# Patient Record
Sex: MALE | Race: WHITE | URBAN - METROPOLITAN AREA
[De-identification: names, ages, dates, MRNs, and addresses within clinical notes are randomized per-mention and may not be internally consistent; named-entity substitution may affect disease eponyms.]

---

## 2021-04-26 ENCOUNTER — OFFICE VISIT (OUTPATIENT)
Dept: URBAN - METROPOLITAN AREA CLINIC 128 | Facility: CLINIC | Age: 71
End: 2021-04-26
Payer: MEDICARE

## 2021-04-26 ENCOUNTER — WEB ENCOUNTER (OUTPATIENT)
Dept: URBAN - METROPOLITAN AREA CLINIC 128 | Facility: CLINIC | Age: 71
End: 2021-04-26

## 2021-04-26 DIAGNOSIS — R63.4 UNINTENTIONAL WEIGHT LOSS: ICD-10-CM

## 2021-04-26 DIAGNOSIS — K52.9 CHRONIC DIARRHEA: ICD-10-CM

## 2021-04-26 PROCEDURE — 99204 OFFICE O/P NEW MOD 45 MIN: CPT | Performed by: INTERNAL MEDICINE

## 2021-04-26 PROCEDURE — 99244 OFF/OP CNSLTJ NEW/EST MOD 40: CPT | Performed by: INTERNAL MEDICINE

## 2021-04-26 RX ORDER — METFORMIN ER 500 MG 500 MG/1
1 TABLET WITH EVENING MEAL TABLET ORAL ONCE A DAY
Status: ACTIVE | COMMUNITY

## 2021-04-26 RX ORDER — SITAGLIPTIN 50 MG/1
AS DIRECTED TABLET, FILM COATED ORAL
Status: ACTIVE | COMMUNITY

## 2021-04-26 RX ORDER — EZETIMIBE 10 MG/1
1 TABLET TABLET ORAL ONCE A DAY
Status: ACTIVE | COMMUNITY

## 2021-04-26 RX ORDER — SODIUM, POTASSIUM,MAG SULFATES 17.5-3.13G
354ML SOLUTION, RECONSTITUTED, ORAL ORAL
Qty: 354 MILLILITER | Refills: 0 | OUTPATIENT
Start: 2021-04-26 | End: 2021-04-27

## 2021-04-26 RX ORDER — CHOLESTYRAMINE LIGHT 4 G/5.7G
1 SCOOP POWDER, FOR SUSPENSION ORAL BID
Qty: 60 | Refills: 2 | OUTPATIENT
Start: 2021-04-26

## 2021-04-26 RX ORDER — GLIMEPIRIDE 1 MG/1
1 TABLET WITH BREAKFAST OR THE FIRST MAIN MEAL OF THE DAY TABLET ORAL ONCE A DAY
Status: ACTIVE | COMMUNITY

## 2021-04-26 RX ORDER — LEVOTHYROXINE SODIUM 25 UG/1
1 TABLET IN THE MORNING ON AN EMPTY STOMACH TABLET ORAL ONCE A DAY
Status: ACTIVE | COMMUNITY

## 2021-04-26 RX ORDER — KRILL/OM-3/DHA/EPA/PHOSPHO/AST 1000-230MG
1 TABLET CAPSULE ORAL ONCE A DAY
Status: ACTIVE | COMMUNITY

## 2021-04-26 RX ORDER — FENOFIBRATE 134 MG/1
1 CAPSULE WITH A MEAL CAPSULE ORAL ONCE A DAY
Status: ACTIVE | COMMUNITY

## 2021-04-26 RX ORDER — ROSUVASTATIN CALCIUM 10 MG/1
1 TABLET TABLET, FILM COATED ORAL ONCE A DAY
Status: ACTIVE | COMMUNITY

## 2021-04-26 NOTE — HPI-TODAY'S VISIT:
Mr. Pompa is a 70 year old male who was referred by Dr. Aj Wills for chronic diarrhea. A  copy of this note will be sent to his referring physician.    Mr. Pompa reports having diarrhea for approximately 1 month.  He reports on average having 3-4 BMs/day. He reports diarrhea is associated with abdominal cramping but not hematochezia.   He reports having lost 14 lbs since onset of symptoms.  He received lomotil and reports no improvement in symptoms.  C. diff testing performed by his PCP was negative.   Mr. Pompa has never had an EGD or colonoscopy before.   He reports having CKD stage 3.

## 2021-05-06 ENCOUNTER — TELEPHONE ENCOUNTER (OUTPATIENT)
Dept: URBAN - METROPOLITAN AREA CLINIC 23 | Facility: CLINIC | Age: 71
End: 2021-05-06

## 2021-05-13 ENCOUNTER — CLAIMS CREATED FROM THE CLAIM WINDOW (OUTPATIENT)
Dept: URBAN - METROPOLITAN AREA CLINIC 4 | Facility: CLINIC | Age: 71
End: 2021-05-13
Payer: MEDICARE

## 2021-05-13 ENCOUNTER — OFFICE VISIT (OUTPATIENT)
Dept: URBAN - METROPOLITAN AREA SURGERY CENTER 31 | Facility: SURGERY CENTER | Age: 71
End: 2021-05-13
Payer: MEDICARE

## 2021-05-13 DIAGNOSIS — K29.40 CHRONIC ATROPHIC GASTRITIS WITHOUT BLEEDING: ICD-10-CM

## 2021-05-13 DIAGNOSIS — K51.919 ULCERATIVE COLITIS, UNSPECIFIED WITH UNSPECIFIED COMPLICATIONS: ICD-10-CM

## 2021-05-13 DIAGNOSIS — K50.811 CROHN'S DISEASE OF BOTH SMALL AND LARGE INTESTINE WITH RECTAL BLEEDING: ICD-10-CM

## 2021-05-13 DIAGNOSIS — K31.89 GASTRIC PERFORATION WITHOUT ULCER: ICD-10-CM

## 2021-05-13 DIAGNOSIS — K31.89 ACQUIRED DEFORMITY OF DUODENUM: ICD-10-CM

## 2021-05-13 PROCEDURE — 45380 COLONOSCOPY AND BIOPSY: CPT | Performed by: INTERNAL MEDICINE

## 2021-05-13 PROCEDURE — 43239 EGD BIOPSY SINGLE/MULTIPLE: CPT | Performed by: INTERNAL MEDICINE

## 2021-05-13 PROCEDURE — 88341 IMHCHEM/IMCYTCHM EA ADD ANTB: CPT | Performed by: PATHOLOGY

## 2021-05-13 PROCEDURE — 88342 IMHCHEM/IMCYTCHM 1ST ANTB: CPT | Performed by: PATHOLOGY

## 2021-05-13 PROCEDURE — 88305 TISSUE EXAM BY PATHOLOGIST: CPT | Performed by: PATHOLOGY

## 2021-05-13 PROCEDURE — G8907 PT DOC NO EVENTS ON DISCHARG: HCPCS | Performed by: INTERNAL MEDICINE

## 2021-05-13 RX ORDER — LEVOTHYROXINE SODIUM 25 UG/1
1 TABLET IN THE MORNING ON AN EMPTY STOMACH TABLET ORAL ONCE A DAY
Status: ACTIVE | COMMUNITY

## 2021-05-13 RX ORDER — FENOFIBRATE 134 MG/1
1 CAPSULE WITH A MEAL CAPSULE ORAL ONCE A DAY
Status: ACTIVE | COMMUNITY

## 2021-05-13 RX ORDER — CHOLESTYRAMINE LIGHT 4 G/5.7G
1 SCOOP POWDER, FOR SUSPENSION ORAL BID
Qty: 60 | Refills: 2 | Status: ACTIVE | COMMUNITY
Start: 2021-04-26

## 2021-05-13 RX ORDER — GLIMEPIRIDE 1 MG/1
1 TABLET WITH BREAKFAST OR THE FIRST MAIN MEAL OF THE DAY TABLET ORAL ONCE A DAY
Status: ACTIVE | COMMUNITY

## 2021-05-13 RX ORDER — PREDNISONE 20 MG/1
2 TABLETS TABLET ORAL ONCE A DAY
Qty: 60 TABLET | Refills: 2 | OUTPATIENT
Start: 2021-05-13 | End: 2021-08-11

## 2021-05-13 RX ORDER — METFORMIN ER 500 MG 500 MG/1
1 TABLET WITH EVENING MEAL TABLET ORAL ONCE A DAY
Status: ACTIVE | COMMUNITY

## 2021-05-13 RX ORDER — SITAGLIPTIN 50 MG/1
AS DIRECTED TABLET, FILM COATED ORAL
Status: ACTIVE | COMMUNITY

## 2021-05-13 RX ORDER — EZETIMIBE 10 MG/1
1 TABLET TABLET ORAL ONCE A DAY
Status: ACTIVE | COMMUNITY

## 2021-05-13 RX ORDER — KRILL/OM-3/DHA/EPA/PHOSPHO/AST 1000-230MG
1 TABLET CAPSULE ORAL ONCE A DAY
Status: ACTIVE | COMMUNITY

## 2021-05-13 RX ORDER — ROSUVASTATIN CALCIUM 10 MG/1
1 TABLET TABLET, FILM COATED ORAL ONCE A DAY
Status: ACTIVE | COMMUNITY

## 2021-05-25 ENCOUNTER — TELEPHONE ENCOUNTER (OUTPATIENT)
Dept: URBAN - METROPOLITAN AREA CLINIC 92 | Facility: CLINIC | Age: 71
End: 2021-05-25

## 2021-06-30 ENCOUNTER — OFFICE VISIT (OUTPATIENT)
Dept: URBAN - METROPOLITAN AREA CLINIC 128 | Facility: CLINIC | Age: 71
End: 2021-06-30
Payer: MEDICARE

## 2021-06-30 VITALS
WEIGHT: 161.4 LBS | TEMPERATURE: 97.8 F | SYSTOLIC BLOOD PRESSURE: 132 MMHG | DIASTOLIC BLOOD PRESSURE: 75 MMHG | BODY MASS INDEX: 23.91 KG/M2 | HEART RATE: 94 BPM | HEIGHT: 69 IN

## 2021-06-30 DIAGNOSIS — K52.9 CHRONIC DIARRHEA: ICD-10-CM

## 2021-06-30 DIAGNOSIS — K31.89 INTESTINAL METAPLASIA OF GASTRIC MUCOSA: ICD-10-CM

## 2021-06-30 DIAGNOSIS — K29.60 OTHER GASTRITIS WITHOUT HEMORRHAGE, UNSPECIFIED CHRONICITY: ICD-10-CM

## 2021-06-30 DIAGNOSIS — R63.4 UNINTENTIONAL WEIGHT LOSS: ICD-10-CM

## 2021-06-30 PROCEDURE — 99213 OFFICE O/P EST LOW 20 MIN: CPT | Performed by: PHYSICIAN ASSISTANT

## 2021-06-30 RX ORDER — SITAGLIPTIN 50 MG/1
AS DIRECTED TABLET, FILM COATED ORAL
COMMUNITY

## 2021-06-30 RX ORDER — LEVOTHYROXINE SODIUM 25 UG/1
1 TABLET IN THE MORNING ON AN EMPTY STOMACH TABLET ORAL ONCE A DAY
COMMUNITY

## 2021-06-30 RX ORDER — KRILL/OM-3/DHA/EPA/PHOSPHO/AST 1000-230MG
1 TABLET CAPSULE ORAL ONCE A DAY
COMMUNITY

## 2021-06-30 RX ORDER — METFORMIN ER 500 MG 500 MG/1
1 TABLET WITH EVENING MEAL TABLET ORAL ONCE A DAY
COMMUNITY

## 2021-06-30 RX ORDER — CHOLESTYRAMINE LIGHT 4 G/5.7G
1 SCOOP POWDER, FOR SUSPENSION ORAL BID
Qty: 60 | Refills: 2 | OUTPATIENT

## 2021-06-30 RX ORDER — FENOFIBRATE 134 MG/1
1 CAPSULE WITH A MEAL CAPSULE ORAL ONCE A DAY
COMMUNITY

## 2021-06-30 RX ORDER — PREDNISONE 20 MG/1
2 TABLETS TABLET ORAL ONCE A DAY
Qty: 60 TABLET | Refills: 2 | COMMUNITY
Start: 2021-05-13 | End: 2021-08-11

## 2021-06-30 RX ORDER — GLIMEPIRIDE 1 MG/1
1 TABLET WITH BREAKFAST OR THE FIRST MAIN MEAL OF THE DAY TABLET ORAL ONCE A DAY
COMMUNITY

## 2021-06-30 RX ORDER — EZETIMIBE 10 MG/1
1 TABLET TABLET ORAL ONCE A DAY
COMMUNITY

## 2021-06-30 RX ORDER — CHOLESTYRAMINE LIGHT 4 G/5.7G
1 SCOOP POWDER, FOR SUSPENSION ORAL BID
Qty: 60 | Refills: 2 | COMMUNITY
Start: 2021-04-26

## 2021-06-30 RX ORDER — ROSUVASTATIN CALCIUM 10 MG/1
1 TABLET TABLET, FILM COATED ORAL ONCE A DAY
COMMUNITY

## 2021-06-30 RX ORDER — MESALAMINE 1.2 G/1
4 TABLETS TABLET, DELAYED RELEASE ORAL ONCE A DAY
Qty: 360 TABLET | Refills: 2 | OUTPATIENT
Start: 2021-06-30 | End: 2022-03-27

## 2021-06-30 NOTE — HPI-TODAY'S VISIT:
Mr. Pompa is a 70 year old male who was referred by Dr. Aj Wills for chronic diarrhea. A  copy of this note will be sent to his referring physician.    Mr. Pompa reports having diarrhea for approximately 1 month.  He reports on average having 3-4 BMs/day. He reports diarrhea is associated with abdominal cramping but not hematochezia.   He reports having lost 14 lbs since onset of symptoms.  He received lomotil and reports no improvement in symptoms.  C. diff testing performed by his PCP was negative.   He reports having CKD stage 3.  His 5/21 EGD revealed chronic inactive gastritis with focal intestinal metaplasia and histological changes suggestive of treated h pylori gastritis in the stomach antrum with negative H pylori, dysplasia, or malignancy. His 2021 colonoscopy revealed diffuse chronic active colitis consistent with ulcerative colitis in left and right colon. He has not done the labs yet. He is on prednisone and doing very well on this and now has 2-3 BMs a day that are formed now.

## 2021-07-14 PROBLEM — 71833008: Status: ACTIVE | Noted: 2021-05-13

## 2021-08-31 ENCOUNTER — TELEPHONE ENCOUNTER (OUTPATIENT)
Dept: URBAN - METROPOLITAN AREA CLINIC 128 | Facility: CLINIC | Age: 71
End: 2021-08-31

## 2021-10-07 ENCOUNTER — OFFICE VISIT (OUTPATIENT)
Dept: URBAN - METROPOLITAN AREA SURGERY CENTER 31 | Facility: SURGERY CENTER | Age: 71
End: 2021-10-07
Payer: MEDICARE

## 2021-10-07 DIAGNOSIS — K51.00 ACUTE ULCERATIVE PANCOLITIS: ICD-10-CM

## 2021-10-07 DIAGNOSIS — K21.9 ACID REFLUX: ICD-10-CM

## 2021-10-07 PROCEDURE — G8907 PT DOC NO EVENTS ON DISCHARG: HCPCS | Performed by: INTERNAL MEDICINE

## 2021-10-07 PROCEDURE — 45380 COLONOSCOPY AND BIOPSY: CPT | Performed by: INTERNAL MEDICINE

## 2021-10-07 PROCEDURE — 43235 EGD DIAGNOSTIC BRUSH WASH: CPT | Performed by: INTERNAL MEDICINE

## 2021-11-01 ENCOUNTER — TELEPHONE ENCOUNTER (OUTPATIENT)
Dept: URBAN - METROPOLITAN AREA CLINIC 19 | Facility: CLINIC | Age: 71
End: 2021-11-01

## 2021-12-08 ENCOUNTER — OFFICE VISIT (OUTPATIENT)
Dept: URBAN - METROPOLITAN AREA CLINIC 128 | Facility: CLINIC | Age: 71
End: 2021-12-08
Payer: MEDICARE

## 2021-12-08 ENCOUNTER — WEB ENCOUNTER (OUTPATIENT)
Dept: URBAN - METROPOLITAN AREA CLINIC 128 | Facility: CLINIC | Age: 71
End: 2021-12-08

## 2021-12-08 VITALS
DIASTOLIC BLOOD PRESSURE: 79 MMHG | SYSTOLIC BLOOD PRESSURE: 140 MMHG | HEART RATE: 102 BPM | WEIGHT: 187 LBS | TEMPERATURE: 97.3 F | HEIGHT: 69 IN | BODY MASS INDEX: 27.7 KG/M2

## 2021-12-08 DIAGNOSIS — K29.60 OTHER GASTRITIS WITHOUT HEMORRHAGE, UNSPECIFIED CHRONICITY: ICD-10-CM

## 2021-12-08 DIAGNOSIS — Z86.19 HISTORY OF HEPATITIS C: ICD-10-CM

## 2021-12-08 DIAGNOSIS — R63.4 UNINTENTIONAL WEIGHT LOSS: ICD-10-CM

## 2021-12-08 DIAGNOSIS — K52.9 CHRONIC DIARRHEA: ICD-10-CM

## 2021-12-08 PROCEDURE — 99213 OFFICE O/P EST LOW 20 MIN: CPT | Performed by: PHYSICIAN ASSISTANT

## 2021-12-08 RX ORDER — EZETIMIBE 10 MG/1
1 TABLET TABLET ORAL ONCE A DAY
Status: ACTIVE | COMMUNITY

## 2021-12-08 RX ORDER — GLIMEPIRIDE 1 MG/1
1 TABLET WITH BREAKFAST OR THE FIRST MAIN MEAL OF THE DAY TABLET ORAL ONCE A DAY
Status: ACTIVE | COMMUNITY

## 2021-12-08 RX ORDER — MESALAMINE 1.2 G/1
4 TABLETS TABLET, DELAYED RELEASE ORAL ONCE A DAY
Qty: 360 TABLET | Refills: 2 | Status: ACTIVE | COMMUNITY
Start: 2021-06-30 | End: 2022-03-27

## 2021-12-08 RX ORDER — MESALAMINE 1.2 G/1
4 TABLETS TABLET, DELAYED RELEASE ORAL ONCE A DAY
Qty: 360 TABLET | Refills: 2 | OUTPATIENT

## 2021-12-08 RX ORDER — CHOLESTYRAMINE LIGHT 4 G/5.7G
1 SCOOP POWDER, FOR SUSPENSION ORAL BID
Qty: 60 | Refills: 2 | Status: DISCONTINUED | COMMUNITY

## 2021-12-08 RX ORDER — KRILL/OM-3/DHA/EPA/PHOSPHO/AST 1000-230MG
1 TABLET CAPSULE ORAL ONCE A DAY
Status: ACTIVE | COMMUNITY

## 2021-12-08 RX ORDER — SITAGLIPTIN 50 MG/1
AS DIRECTED TABLET, FILM COATED ORAL
Status: ACTIVE | COMMUNITY

## 2021-12-08 RX ORDER — FENOFIBRATE 134 MG/1
1 CAPSULE WITH A MEAL CAPSULE ORAL ONCE A DAY
Status: ACTIVE | COMMUNITY

## 2021-12-08 RX ORDER — METFORMIN ER 500 MG 500 MG/1
1 TABLET WITH EVENING MEAL TABLET ORAL ONCE A DAY
Status: ACTIVE | COMMUNITY

## 2021-12-08 RX ORDER — LEVOTHYROXINE SODIUM 25 UG/1
1 TABLET IN THE MORNING ON AN EMPTY STOMACH TABLET ORAL ONCE A DAY
Status: ACTIVE | COMMUNITY

## 2021-12-08 RX ORDER — ROSUVASTATIN CALCIUM 10 MG/1
1 TABLET TABLET, FILM COATED ORAL ONCE A DAY
Status: ACTIVE | COMMUNITY

## 2021-12-08 NOTE — HPI-TODAY'S VISIT:
Mr. Pompa is a 70 year old male who was referred by Dr. Aj Wills for chronic diarrhea. A  copy of this note will be sent to his referring physician.    Mr. Pompa reports having had previously diarrhea  of 5-6 BMs/day but now on lialda 4 pills a day he is having 2-3 Bms a day of formed stool with no diarrhea. He has gained 20 pounds intentionally now since 06/21. C. diff testing performed by his PCP was negative. He reports having CKD stage 3.  His 5/21 EGD revealed chronic inactive gastritis with focal intestinal metaplasia and histological changes suggestive of treated h pylori gastritis in the stomach antrum with negative H pylori, dysplasia, or malignancy. His /21 2021 colonoscopy revealed diffuse chronic active colitis consistent with ulcerative colitis in left and right colon. He has not done the labs yet but he will have them done. His 10/21 EGD was normal, no specimens were collected and the 10/21 colonoscopy revealed focal mild colitis in the right and left colon. The patient is feeling overall better.

## 2021-12-10 ENCOUNTER — OFFICE VISIT (OUTPATIENT)
Dept: URBAN - METROPOLITAN AREA CLINIC 128 | Facility: CLINIC | Age: 71
End: 2021-12-10

## 2021-12-20 ENCOUNTER — TELEPHONE ENCOUNTER (OUTPATIENT)
Dept: URBAN - METROPOLITAN AREA CLINIC 128 | Facility: CLINIC | Age: 71
End: 2021-12-20

## 2021-12-21 LAB
ACCA: 19
ALCA: 23
AMCA: 121
ATYPICAL PANCA: NEGATIVE
CA 19-9: 11
CEA: 2.4
COMMENTS: (no result)
DEAMIDATED GLIADIN ABS, IGA: 3
DEAMIDATED GLIADIN ABS, IGG: 1
ENDOMYSIAL ANTIBODY IGA: NEGATIVE
GASCA: 2
HCV GENOTYPE: (no result)
HCV LOG10: (no result)
HEPATITIS C GENOTYPE: (no result)
HEPATITIS C QUANTITATION: (no result)
IMMUNOGLOBULIN A, QN, SERUM: 140
Lab: (no result)
REFLEX TEST INFORMATION: (no result)
T-TRANSGLUTAMINASE (TTG) IGA: <2
T-TRANSGLUTAMINASE (TTG) IGG: <2
TEST INFORMATION:: (no result)
TSH: 2.27

## 2022-01-19 ENCOUNTER — ERX REFILL RESPONSE (OUTPATIENT)
Dept: URBAN - METROPOLITAN AREA CLINIC 128 | Facility: CLINIC | Age: 72
End: 2022-01-19

## 2022-01-19 RX ORDER — MESALAMINE 1.2 G/1
4 TABLETS TABLET, DELAYED RELEASE ORAL ONCE A DAY
Qty: 360 TABLET | Refills: 2 | OUTPATIENT

## 2022-01-19 RX ORDER — MESALAMINE 1.2 G/1
TAKE 4 TABLETS BY MOUTH  ONCE DAILY TABLET, DELAYED RELEASE ORAL
Qty: 360 TABLET | Refills: 4 | OUTPATIENT

## 2022-01-20 ENCOUNTER — WEB ENCOUNTER (OUTPATIENT)
Dept: URBAN - METROPOLITAN AREA CLINIC 13 | Facility: CLINIC | Age: 72
End: 2022-01-20

## 2022-01-20 ENCOUNTER — TELEPHONE ENCOUNTER (OUTPATIENT)
Dept: URBAN - METROPOLITAN AREA CLINIC 128 | Facility: CLINIC | Age: 72
End: 2022-01-20

## 2022-01-20 RX ORDER — MESALAMINE 1.2 G/1
TAKE 4 TABLETS BY MOUTH  ONCE DAILY TABLET, DELAYED RELEASE ORAL
Qty: 360 TABLET | Refills: 4

## 2022-01-31 ENCOUNTER — TELEPHONE ENCOUNTER (OUTPATIENT)
Dept: URBAN - METROPOLITAN AREA CLINIC 18 | Facility: CLINIC | Age: 72
End: 2022-01-31

## 2022-03-03 ENCOUNTER — WEB ENCOUNTER (OUTPATIENT)
Dept: URBAN - METROPOLITAN AREA CLINIC 128 | Facility: CLINIC | Age: 72
End: 2022-03-03

## 2022-03-09 ENCOUNTER — WEB ENCOUNTER (OUTPATIENT)
Dept: URBAN - METROPOLITAN AREA CLINIC 128 | Facility: CLINIC | Age: 72
End: 2022-03-09

## 2022-03-09 ENCOUNTER — OFFICE VISIT (OUTPATIENT)
Dept: URBAN - METROPOLITAN AREA CLINIC 128 | Facility: CLINIC | Age: 72
End: 2022-03-09
Payer: MEDICARE

## 2022-03-09 DIAGNOSIS — K52.9 IBD (INFLAMMATORY BOWEL DISEASE): ICD-10-CM

## 2022-03-09 DIAGNOSIS — R63.4 UNINTENTIONAL WEIGHT LOSS: ICD-10-CM

## 2022-03-09 DIAGNOSIS — K31.89 INTESTINAL METAPLASIA OF GASTRIC MUCOSA: ICD-10-CM

## 2022-03-09 DIAGNOSIS — K29.60 OTHER GASTRITIS WITHOUT HEMORRHAGE, UNSPECIFIED CHRONICITY: ICD-10-CM

## 2022-03-09 PROBLEM — 4556007: Status: ACTIVE | Noted: 2021-06-30

## 2022-03-09 PROCEDURE — 99213 OFFICE O/P EST LOW 20 MIN: CPT | Performed by: PHYSICIAN ASSISTANT

## 2022-03-09 RX ORDER — FENOFIBRATE 134 MG/1
1 CAPSULE WITH A MEAL CAPSULE ORAL ONCE A DAY
Status: ACTIVE | COMMUNITY

## 2022-03-09 RX ORDER — LEVOTHYROXINE SODIUM 25 UG/1
1 TABLET IN THE MORNING ON AN EMPTY STOMACH TABLET ORAL ONCE A DAY
Status: ACTIVE | COMMUNITY

## 2022-03-09 RX ORDER — ROSUVASTATIN CALCIUM 10 MG/1
1 TABLET TABLET, FILM COATED ORAL ONCE A DAY
Status: ACTIVE | COMMUNITY

## 2022-03-09 RX ORDER — SITAGLIPTIN 50 MG/1
AS DIRECTED TABLET, FILM COATED ORAL
Status: ACTIVE | COMMUNITY

## 2022-03-09 RX ORDER — GLIMEPIRIDE 1 MG/1
1 TABLET WITH BREAKFAST OR THE FIRST MAIN MEAL OF THE DAY TABLET ORAL ONCE A DAY
Status: ACTIVE | COMMUNITY

## 2022-03-09 RX ORDER — KRILL/OM-3/DHA/EPA/PHOSPHO/AST 1000-230MG
1 TABLET CAPSULE ORAL ONCE A DAY
Status: ACTIVE | COMMUNITY

## 2022-03-09 RX ORDER — MESALAMINE 1.2 G/1
TAKE 4 TABLETS BY MOUTH  ONCE DAILY TABLET, DELAYED RELEASE ORAL
Qty: 360 TABLET | Refills: 4 | Status: ACTIVE | COMMUNITY

## 2022-03-09 RX ORDER — METFORMIN ER 500 MG 500 MG/1
1 TABLET WITH EVENING MEAL TABLET ORAL ONCE A DAY
Status: ON HOLD | COMMUNITY

## 2022-03-09 RX ORDER — EZETIMIBE 10 MG/1
1 TABLET TABLET ORAL ONCE A DAY
Status: ACTIVE | COMMUNITY

## 2022-03-09 RX ORDER — MESALAMINE 1.2 G/1
4 TABLETS TABLET, DELAYED RELEASE ORAL ONCE A DAY
Qty: 360 TABLET | Refills: 2 | OUTPATIENT

## 2022-03-09 RX ORDER — POLYETHYLENE GLYCOL 3350, SODIUM CHLORIDE, SODIUM BICARBONATE AND POTASSIUM CHLORIDE 420G
AS DIRECTED KIT ORAL ONCE
Qty: 420 GRAM | Refills: 0 | OUTPATIENT
Start: 2022-03-09 | End: 2022-03-10

## 2022-03-09 NOTE — HPI-TODAY'S VISIT:
Mr. Pompa is a 70 year old male who was initially referred by Dr. Aj Wills for chronic diarrhea. A  copy of this note will be sent to his referring physician.    Mr. Pompa reports having had previously diarrhea  of 5-6 BMs/day but now on lialda 4 pills a day he is having 2 BMs a day of formed stool with no diarrhea. He has gained 20 pounds intentionally now since 06/21. C. diff testing performed by his PCP was negative. He reports having CKD stage 3. He is not on dialysis. He has no cardiac or pulmonary issues.  His 5/21 EGD revealed chronic inactive gastritis with focal intestinal metaplasia and histological changes suggestive of treated h pylori gastritis in the stomach antrum with negative H pylori, dysplasia, or malignancy. His 12/2021 colonoscopy revealed diffuse chronic active colitis consistent with ulcerative colitis in left and right colon. His 10/21 EGD was normal, no specimens were collected and the 10/21 colonoscopy revealed focal mild colitis in the right and left colon. The patient is feeling overall better. His IBD exanded panel was leaning towrds Crohns disease. He did not do his MR enterography because at this time he could not have the contrast as he has CKD stage 3. His celiac panel was negative. He has not done his h pylori test yet.

## 2022-03-10 ENCOUNTER — TELEPHONE ENCOUNTER (OUTPATIENT)
Dept: URBAN - METROPOLITAN AREA CLINIC 128 | Facility: CLINIC | Age: 72
End: 2022-03-10

## 2022-12-08 ENCOUNTER — OFFICE VISIT (OUTPATIENT)
Dept: URBAN - METROPOLITAN AREA SURGERY CENTER 31 | Facility: SURGERY CENTER | Age: 72
End: 2022-12-08

## 2022-12-12 ENCOUNTER — TELEPHONE ENCOUNTER (OUTPATIENT)
Dept: URBAN - METROPOLITAN AREA CLINIC 128 | Facility: CLINIC | Age: 72
End: 2022-12-12

## 2022-12-13 ENCOUNTER — TELEPHONE ENCOUNTER (OUTPATIENT)
Dept: URBAN - METROPOLITAN AREA CLINIC 19 | Facility: CLINIC | Age: 72
End: 2022-12-13

## 2022-12-21 ENCOUNTER — TELEPHONE ENCOUNTER (OUTPATIENT)
Dept: URBAN - METROPOLITAN AREA CLINIC 128 | Facility: CLINIC | Age: 72
End: 2022-12-21

## 2022-12-22 ENCOUNTER — CLAIMS CREATED FROM THE CLAIM WINDOW (OUTPATIENT)
Dept: URBAN - METROPOLITAN AREA SURGERY CENTER 31 | Facility: SURGERY CENTER | Age: 72
End: 2022-12-22
Payer: MEDICARE

## 2022-12-22 ENCOUNTER — OFFICE VISIT (OUTPATIENT)
Dept: URBAN - METROPOLITAN AREA SURGERY CENTER 31 | Facility: SURGERY CENTER | Age: 72
End: 2022-12-22

## 2022-12-22 ENCOUNTER — CLAIMS CREATED FROM THE CLAIM WINDOW (OUTPATIENT)
Dept: URBAN - METROPOLITAN AREA CLINIC 4 | Facility: CLINIC | Age: 72
End: 2022-12-22
Payer: MEDICARE

## 2022-12-22 DIAGNOSIS — K51.00 ACUTE ULCERATIVE PANCOLITIS: ICD-10-CM

## 2022-12-22 DIAGNOSIS — K31.89 OTHER DISEASES OF STOMACH AND DUODENUM: ICD-10-CM

## 2022-12-22 PROCEDURE — G8907 PT DOC NO EVENTS ON DISCHARG: HCPCS | Performed by: INTERNAL MEDICINE

## 2022-12-22 PROCEDURE — 45380 COLONOSCOPY AND BIOPSY: CPT | Performed by: INTERNAL MEDICINE

## 2022-12-22 PROCEDURE — 88305 TISSUE EXAM BY PATHOLOGIST: CPT | Performed by: PATHOLOGY

## 2022-12-22 RX ORDER — METFORMIN ER 500 MG 500 MG/1
1 TABLET WITH EVENING MEAL TABLET ORAL ONCE A DAY
Status: ON HOLD | COMMUNITY

## 2022-12-22 RX ORDER — KRILL/OM-3/DHA/EPA/PHOSPHO/AST 1000-230MG
1 TABLET CAPSULE ORAL ONCE A DAY
Status: ACTIVE | COMMUNITY

## 2022-12-22 RX ORDER — MESALAMINE 1.2 G/1
4 TABLETS TABLET, DELAYED RELEASE ORAL ONCE A DAY
Qty: 360 TABLET | Refills: 2 | Status: ACTIVE | COMMUNITY

## 2022-12-22 RX ORDER — ROSUVASTATIN CALCIUM 10 MG/1
1 TABLET TABLET, FILM COATED ORAL ONCE A DAY
Status: ACTIVE | COMMUNITY

## 2022-12-22 RX ORDER — GLIMEPIRIDE 1 MG/1
1 TABLET WITH BREAKFAST OR THE FIRST MAIN MEAL OF THE DAY TABLET ORAL ONCE A DAY
Status: ACTIVE | COMMUNITY

## 2022-12-22 RX ORDER — MESALAMINE 1.2 G/1
TAKE 4 TABLETS BY MOUTH  ONCE DAILY TABLET, DELAYED RELEASE ORAL
Qty: 360 TABLET | Refills: 4 | Status: ACTIVE | COMMUNITY

## 2022-12-22 RX ORDER — SITAGLIPTIN 50 MG/1
AS DIRECTED TABLET, FILM COATED ORAL
Status: ACTIVE | COMMUNITY

## 2022-12-22 RX ORDER — EZETIMIBE 10 MG/1
1 TABLET TABLET ORAL ONCE A DAY
Status: ACTIVE | COMMUNITY

## 2022-12-22 RX ORDER — FENOFIBRATE 134 MG/1
1 CAPSULE WITH A MEAL CAPSULE ORAL ONCE A DAY
Status: ACTIVE | COMMUNITY

## 2022-12-22 RX ORDER — LEVOTHYROXINE SODIUM 25 UG/1
1 TABLET IN THE MORNING ON AN EMPTY STOMACH TABLET ORAL ONCE A DAY
Status: ACTIVE | COMMUNITY

## 2023-01-11 ENCOUNTER — ERX REFILL RESPONSE (OUTPATIENT)
Dept: URBAN - METROPOLITAN AREA CLINIC 128 | Facility: CLINIC | Age: 73
End: 2023-01-11

## 2023-01-11 RX ORDER — MESALAMINE 1.2 G/1
4 TABLETS TABLET, DELAYED RELEASE ORAL ONCE A DAY
Qty: 360 TABLET | Refills: 2 | OUTPATIENT

## 2023-01-11 RX ORDER — MESALAMINE 1.2 G/1
TAKE 4 TABLETS BY MOUTH  ONCE DAILY TABLET, DELAYED RELEASE ORAL
Qty: 360 TABLET | Refills: 3 | OUTPATIENT

## 2023-08-18 ENCOUNTER — OFFICE VISIT (OUTPATIENT)
Dept: URBAN - METROPOLITAN AREA CLINIC 128 | Facility: CLINIC | Age: 73
End: 2023-08-18
Payer: MEDICARE

## 2023-08-18 VITALS
BODY MASS INDEX: 28.44 KG/M2 | TEMPERATURE: 98.1 F | HEART RATE: 85 BPM | WEIGHT: 192 LBS | SYSTOLIC BLOOD PRESSURE: 134 MMHG | HEIGHT: 69 IN | DIASTOLIC BLOOD PRESSURE: 78 MMHG

## 2023-08-18 DIAGNOSIS — K52.89 OTHER SPECIFIED NONINFECTIVE GASTROENTERITIS AND COLITIS: ICD-10-CM

## 2023-08-18 DIAGNOSIS — K29.60 OTHER GASTRITIS WITHOUT HEMORRHAGE, UNSPECIFIED CHRONICITY: ICD-10-CM

## 2023-08-18 DIAGNOSIS — R63.4 UNINTENTIONAL WEIGHT LOSS: ICD-10-CM

## 2023-08-18 DIAGNOSIS — R19.7 CHRONIC DIARRHEA: ICD-10-CM

## 2023-08-18 PROCEDURE — 99214 OFFICE O/P EST MOD 30 MIN: CPT | Performed by: PHYSICIAN ASSISTANT

## 2023-08-18 RX ORDER — FENOFIBRATE 134 MG/1
1 CAPSULE WITH A MEAL CAPSULE ORAL ONCE A DAY
Status: ACTIVE | COMMUNITY

## 2023-08-18 RX ORDER — TELMISARTAN 80 MG/1
1 TABLET TABLET ORAL ONCE A DAY
Status: ACTIVE | COMMUNITY

## 2023-08-18 RX ORDER — ROSUVASTATIN CALCIUM 10 MG/1
1 TABLET TABLET, FILM COATED ORAL ONCE A DAY
Status: ACTIVE | COMMUNITY

## 2023-08-18 RX ORDER — LEVOTHYROXINE SODIUM 25 UG/1
1 TABLET IN THE MORNING ON AN EMPTY STOMACH TABLET ORAL ONCE A DAY
Status: ACTIVE | COMMUNITY

## 2023-08-18 RX ORDER — KRILL/OM-3/DHA/EPA/PHOSPHO/AST 1000-230MG
1 TABLET CAPSULE ORAL ONCE A DAY
Status: ACTIVE | COMMUNITY

## 2023-08-18 RX ORDER — TRAZODONE HYDROCHLORIDE 50 MG/1
1 TABLET AT BEDTIME AS NEEDED TABLET ORAL ONCE A DAY
Status: ACTIVE | COMMUNITY

## 2023-08-18 RX ORDER — MESALAMINE 1.2 G/1
TAKE 4 TABLETS BY MOUTH  ONCE DAILY TABLET, DELAYED RELEASE ORAL
Qty: 360 TABLET | Refills: 3 | Status: ACTIVE | COMMUNITY

## 2023-08-18 RX ORDER — GLIMEPIRIDE 1 MG/1
1 TABLET WITH BREAKFAST OR THE FIRST MAIN MEAL OF THE DAY TABLET ORAL ONCE A DAY
Status: ACTIVE | COMMUNITY

## 2023-08-18 RX ORDER — CARVEDILOL 6.25 MG/1
1 TABLET WITH FOOD TABLET, FILM COATED ORAL TWICE A DAY
Status: ACTIVE | COMMUNITY

## 2023-08-18 RX ORDER — EZETIMIBE 10 MG/1
1 TABLET TABLET ORAL ONCE A DAY
Status: ACTIVE | COMMUNITY

## 2023-08-18 RX ORDER — AMLODIPINE BESYLATE 10 MG/1
1 TABLET TABLET ORAL ONCE A DAY
Status: ACTIVE | COMMUNITY

## 2023-08-18 RX ORDER — MESALAMINE 1.2 G/1
4 TABLETS TABLET, DELAYED RELEASE ORAL ONCE A DAY
Qty: 360 TABLET | Refills: 2 | OUTPATIENT

## 2023-08-18 NOTE — HPI-TODAY'S VISIT:
Mr. Pompa is a 70 year old male who was initially referred by Dr. Aj Wills for chronic diarrhea. He has known ulcerative colitis.  HIs 12/2022 colonoscopy reveaked diffuse chronic active ulcerative colitis. A  copy of this note will be sent to his referring physician.  He is doing well on mesalamine 4 tablets a day and he is not having any digestive symptoms. He denies abdominal pain, diarrhea, rectal bleeding or mucous. He has 2 BMs a day. He reports having CKD stage 3. He is not on dialysis. He has no cardiac or pulmonary issues.  His 5/21 EGD revealed chronic inactive gastritis with focal intestinal metaplasia and histological changes suggestive of treated h pylori gastritis in the stomach antrum with negative H pylori, dysplasia, or malignancy. His 12/2021 colonoscopy revealed diffuse chronic active colitis consistent with ulcerative colitis in left and right colon. His 10/21 EGD was normal, no specimens were collected and the 10/21 colonoscopy revealed focal mild colitis in the right and left colon. The patient is feeling overall better. His IBD exanded panel was leaning towrds Crohns disease. He did not do his MR enterography yet but states he will do so now that his nephrologist Dr. Gibson stated he can do it. His celiac panel was negative. He has not done his h pylori test yet.

## 2023-09-08 ENCOUNTER — LAB OUTSIDE AN ENCOUNTER (OUTPATIENT)
Dept: URBAN - METROPOLITAN AREA CLINIC 19 | Facility: CLINIC | Age: 73
End: 2023-09-08

## 2023-10-27 ENCOUNTER — TELEPHONE ENCOUNTER (OUTPATIENT)
Dept: URBAN - METROPOLITAN AREA CLINIC 19 | Facility: CLINIC | Age: 73
End: 2023-10-27

## 2023-12-05 ENCOUNTER — OFFICE VISIT (OUTPATIENT)
Dept: URBAN - METROPOLITAN AREA SURGERY CENTER 31 | Facility: SURGERY CENTER | Age: 73
End: 2023-12-05

## 2024-01-03 ENCOUNTER — OFFICE VISIT (OUTPATIENT)
Dept: URBAN - METROPOLITAN AREA CLINIC 128 | Facility: CLINIC | Age: 74
End: 2024-01-03
Payer: MEDICARE

## 2024-01-03 ENCOUNTER — DASHBOARD ENCOUNTERS (OUTPATIENT)
Age: 74
End: 2024-01-03

## 2024-01-03 VITALS
DIASTOLIC BLOOD PRESSURE: 60 MMHG | HEART RATE: 95 BPM | SYSTOLIC BLOOD PRESSURE: 104 MMHG | BODY MASS INDEX: 27.93 KG/M2 | WEIGHT: 188.6 LBS | HEIGHT: 69 IN | TEMPERATURE: 97.1 F

## 2024-01-03 DIAGNOSIS — K31.89 INTESTINAL METAPLASIA OF GASTRIC MUCOSA: ICD-10-CM

## 2024-01-03 DIAGNOSIS — R19.7 ACUTE DIARRHEA: ICD-10-CM

## 2024-01-03 DIAGNOSIS — K29.60 OTHER GASTRITIS WITHOUT BLEEDING: ICD-10-CM

## 2024-01-03 DIAGNOSIS — K52.9 ACUTE AND CHRONIC COLITIS: ICD-10-CM

## 2024-01-03 PROCEDURE — 99214 OFFICE O/P EST MOD 30 MIN: CPT | Performed by: PHYSICIAN ASSISTANT

## 2024-01-03 RX ORDER — LEVOTHYROXINE SODIUM 25 UG/1
1 TABLET IN THE MORNING ON AN EMPTY STOMACH TABLET ORAL ONCE A DAY
Status: ACTIVE | COMMUNITY

## 2024-01-03 RX ORDER — MESALAMINE 1.2 G/1
4 TABLETS TABLET, DELAYED RELEASE ORAL ONCE A DAY
Qty: 360 TABLET | Refills: 2 | Status: ACTIVE | COMMUNITY

## 2024-01-03 RX ORDER — KRILL/OM-3/DHA/EPA/PHOSPHO/AST 1000-230MG
1 TABLET CAPSULE ORAL ONCE A DAY
Status: ACTIVE | COMMUNITY

## 2024-01-03 RX ORDER — TRAZODONE HYDROCHLORIDE 50 MG/1
1 TABLET AT BEDTIME AS NEEDED TABLET ORAL ONCE A DAY
Status: ACTIVE | COMMUNITY

## 2024-01-03 RX ORDER — CARVEDILOL 6.25 MG/1
1 TABLET WITH FOOD TABLET, FILM COATED ORAL TWICE A DAY
Status: ACTIVE | COMMUNITY

## 2024-01-03 RX ORDER — ROSUVASTATIN CALCIUM 10 MG/1
1 TABLET TABLET, FILM COATED ORAL ONCE A DAY
Status: ACTIVE | COMMUNITY

## 2024-01-03 RX ORDER — TELMISARTAN 80 MG/1
1 TABLET TABLET ORAL ONCE A DAY
Status: ACTIVE | COMMUNITY

## 2024-01-03 RX ORDER — MESALAMINE 1.2 G/1
TAKE 4 TABLETS BY MOUTH  ONCE DAILY TABLET, DELAYED RELEASE ORAL
Qty: 360 TABLET | Refills: 3 | Status: ACTIVE | COMMUNITY

## 2024-01-03 RX ORDER — GLIMEPIRIDE 1 MG/1
1 TABLET WITH BREAKFAST OR THE FIRST MAIN MEAL OF THE DAY TABLET ORAL ONCE A DAY
Status: ACTIVE | COMMUNITY

## 2024-01-03 RX ORDER — FENOFIBRATE 134 MG/1
1 CAPSULE WITH A MEAL CAPSULE ORAL ONCE A DAY
Status: ACTIVE | COMMUNITY

## 2024-01-03 RX ORDER — MESALAMINE 1.2 G/1
4 TABLETS TABLET, DELAYED RELEASE ORAL ONCE A DAY
Qty: 360 TABLET | Refills: 2 | OUTPATIENT

## 2024-01-03 RX ORDER — AMLODIPINE BESYLATE 10 MG/1
1 TABLET TABLET ORAL ONCE A DAY
Status: ACTIVE | COMMUNITY

## 2024-01-03 RX ORDER — EZETIMIBE 10 MG/1
1 TABLET TABLET ORAL ONCE A DAY
Status: ACTIVE | COMMUNITY

## 2024-01-19 LAB
A/G RATIO: 1.7
ABSOLUTE BASOPHILS: 47
ABSOLUTE EOSINOPHILS: 181
ABSOLUTE LYMPHOCYTES: 1226
ABSOLUTE MONOCYTES: 663
ABSOLUTE NEUTROPHILS: 4583
ALBUMIN: 4.4
ALKALINE PHOSPHATASE: 52
ALT (SGPT): 19
AST (SGOT): 22
BASOPHILS: 0.7
BILIRUBIN, TOTAL: 0.5
BUN/CREATININE RATIO: 18
BUN: 30
C-REACTIVE PROTEIN, QUANT: 1.6
CALCIUM: 9.6
CARBON DIOXIDE, TOTAL: 24
CHLORIDE: 95
CREATININE: 1.67
EGFR: 43
EOSINOPHILS: 2.7
GLOBULIN, TOTAL: 2.6
GLUCOSE: 67
H PYLORI BREATH TEST: NOT DETECTED
HEMATOCRIT: 46.8
HEMOGLOBIN: 15.6
LYMPHOCYTES: 18.3
MCH: 29.7
MCHC: 33.3
MCV: 89.1
MITOGEN-NIL: >10
MONOCYTES: 9.9
MPV: 10.2
NEUTROPHILS: 68.4
PLATELET COUNT: 245
POTASSIUM: 4.7
PROTEIN, TOTAL: 7
QUANTIFERON NIL VALUE: 0.04
QUANTIFERON TB1 AG VALUE: 0.01
QUANTIFERON TB2 AG VALUE: 0
QUANTIFERON-TB GOLD PLUS: NEGATIVE
RDW: 14
RED BLOOD CELL COUNT: 5.25
SED RATE BY MODIFIED: 2
SODIUM: 130
WHITE BLOOD CELL COUNT: 6.7

## 2024-04-16 ENCOUNTER — COLON (OUTPATIENT)
Dept: URBAN - METROPOLITAN AREA SURGERY CENTER 31 | Facility: SURGERY CENTER | Age: 74
End: 2024-04-16
Payer: MEDICARE

## 2024-04-16 ENCOUNTER — LAB (OUTPATIENT)
Dept: URBAN - METROPOLITAN AREA CLINIC 4 | Facility: CLINIC | Age: 74
End: 2024-04-16
Payer: MEDICARE

## 2024-04-16 DIAGNOSIS — K50.119 CROHN'S DISEASE OF LARGE INTESTINE WITH UNSPECIFIED COMPLICATIONS: ICD-10-CM

## 2024-04-16 DIAGNOSIS — K50.00 CROHN'S DISEASE OF SMALL INTESTINE WITHOUT COMPLICATIONS: ICD-10-CM

## 2024-04-16 DIAGNOSIS — K63.89 OTHER SPECIFIED DISEASES OF INTESTINE: ICD-10-CM

## 2024-04-16 DIAGNOSIS — K50.80 CROHN'S COLITIS: ICD-10-CM

## 2024-04-16 PROCEDURE — G8907 PT DOC NO EVENTS ON DISCHARG: HCPCS | Performed by: INTERNAL MEDICINE

## 2024-04-16 PROCEDURE — 88305 TISSUE EXAM BY PATHOLOGIST: CPT | Performed by: PATHOLOGY

## 2024-04-16 PROCEDURE — 45380 COLONOSCOPY AND BIOPSY: CPT | Performed by: INTERNAL MEDICINE

## 2024-04-16 RX ORDER — FENOFIBRATE 134 MG/1
1 CAPSULE WITH A MEAL CAPSULE ORAL ONCE A DAY
Status: ACTIVE | COMMUNITY

## 2024-04-16 RX ORDER — ROSUVASTATIN CALCIUM 10 MG/1
1 TABLET TABLET, FILM COATED ORAL ONCE A DAY
Status: ACTIVE | COMMUNITY

## 2024-04-16 RX ORDER — EZETIMIBE 10 MG/1
1 TABLET TABLET ORAL ONCE A DAY
Status: ACTIVE | COMMUNITY

## 2024-04-16 RX ORDER — KRILL/OM-3/DHA/EPA/PHOSPHO/AST 1000-230MG
1 TABLET CAPSULE ORAL ONCE A DAY
Status: ACTIVE | COMMUNITY

## 2024-04-16 RX ORDER — MESALAMINE 1.2 G/1
TAKE 4 TABLETS BY MOUTH  ONCE DAILY TABLET, DELAYED RELEASE ORAL
Qty: 360 TABLET | Refills: 3 | Status: ACTIVE | COMMUNITY

## 2024-04-16 RX ORDER — TRAZODONE HYDROCHLORIDE 50 MG/1
1 TABLET AT BEDTIME AS NEEDED TABLET ORAL ONCE A DAY
Status: ACTIVE | COMMUNITY

## 2024-04-16 RX ORDER — LEVOTHYROXINE SODIUM 25 UG/1
1 TABLET IN THE MORNING ON AN EMPTY STOMACH TABLET ORAL ONCE A DAY
Status: ACTIVE | COMMUNITY

## 2024-04-16 RX ORDER — MESALAMINE 1.2 G/1
4 TABLETS TABLET, DELAYED RELEASE ORAL ONCE A DAY
Qty: 360 TABLET | Refills: 2 | Status: ACTIVE | COMMUNITY

## 2024-04-16 RX ORDER — CARVEDILOL 6.25 MG/1
1 TABLET WITH FOOD TABLET, FILM COATED ORAL TWICE A DAY
Status: ACTIVE | COMMUNITY

## 2024-04-16 RX ORDER — GLIMEPIRIDE 1 MG/1
1 TABLET WITH BREAKFAST OR THE FIRST MAIN MEAL OF THE DAY TABLET ORAL ONCE A DAY
Status: ACTIVE | COMMUNITY

## 2024-04-16 RX ORDER — TELMISARTAN 80 MG/1
1 TABLET TABLET ORAL ONCE A DAY
Status: ACTIVE | COMMUNITY

## 2024-04-16 RX ORDER — AMLODIPINE BESYLATE 10 MG/1
1 TABLET TABLET ORAL ONCE A DAY
Status: ACTIVE | COMMUNITY

## 2024-05-08 ENCOUNTER — OFFICE VISIT (OUTPATIENT)
Dept: URBAN - METROPOLITAN AREA CLINIC 128 | Facility: CLINIC | Age: 74
End: 2024-05-08

## 2024-05-28 ENCOUNTER — TELEPHONE ENCOUNTER (OUTPATIENT)
Dept: URBAN - METROPOLITAN AREA CLINIC 96 | Facility: CLINIC | Age: 74
End: 2024-05-28

## 2024-05-28 ENCOUNTER — OFFICE VISIT (OUTPATIENT)
Dept: URBAN - METROPOLITAN AREA TELEHEALTH 2 | Facility: TELEHEALTH | Age: 74
End: 2024-05-28
Payer: MEDICARE

## 2024-05-28 DIAGNOSIS — K50.811 CROHN'S DISEASE OF BOTH SMALL AND LARGE INTESTINE WITH RECTAL BLEEDING: ICD-10-CM

## 2024-05-28 DIAGNOSIS — R19.7 DIARRHEA: ICD-10-CM

## 2024-05-28 PROCEDURE — 99215 OFFICE O/P EST HI 40 MIN: CPT | Performed by: INTERNAL MEDICINE

## 2024-05-28 RX ORDER — KRILL/OM-3/DHA/EPA/PHOSPHO/AST 1000-230MG
1 TABLET CAPSULE ORAL ONCE A DAY
Status: ACTIVE | COMMUNITY

## 2024-05-28 RX ORDER — CARVEDILOL 6.25 MG/1
1 TABLET WITH FOOD TABLET, FILM COATED ORAL TWICE A DAY
Status: ACTIVE | COMMUNITY

## 2024-05-28 RX ORDER — MESALAMINE 375 MG/1
2 CAPSULES IN THE MORNING CAPSULE, EXTENDED RELEASE ORAL ONCE A DAY
Qty: 180 CAPSULE | Refills: 4 | OUTPATIENT
Start: 2024-05-28 | End: 2025-08-21

## 2024-05-28 RX ORDER — TRAZODONE HYDROCHLORIDE 50 MG/1
1 TABLET AT BEDTIME AS NEEDED TABLET ORAL ONCE A DAY
Status: ACTIVE | COMMUNITY

## 2024-05-28 RX ORDER — AMLODIPINE BESYLATE 10 MG/1
1 TABLET TABLET ORAL ONCE A DAY
Status: ACTIVE | COMMUNITY

## 2024-05-28 RX ORDER — FENOFIBRATE 134 MG/1
1 CAPSULE WITH A MEAL CAPSULE ORAL ONCE A DAY
Status: ACTIVE | COMMUNITY

## 2024-05-28 RX ORDER — ROSUVASTATIN 10 MG/1
1 TABLET TABLET, FILM COATED ORAL ONCE A DAY
Status: ACTIVE | COMMUNITY

## 2024-05-28 RX ORDER — TELMISARTAN 80 MG/1
1 TABLET TABLET ORAL ONCE A DAY
Status: ACTIVE | COMMUNITY

## 2024-05-28 RX ORDER — PREDNISONE 10 MG/1
40 TABLETS ONCE A DAY FOR 7 DAYS, 35 TABLETS ONCE A DAY FOR 7 DAYS, 30 TABLETS ONCE A DAY FOR 7 DAYS, 25 TABLETS ONCE A DAY FOR 7 DAYS, 20 TABLETS ONCE A DAY FOR 7 DAYS, 15 TABLETS ONCE A DAY FOR 7 DAYS, 10 TABLETS ONCE A DAY FOR 7 DAYS, 5 TABLETS ONCE A DAY FOR 7 DAYS TABLET ORAL ONCE A DAY
Refills: 0 | Status: ACTIVE | COMMUNITY
Start: 2024-04-23 | End: 2024-06-18

## 2024-05-28 RX ORDER — MESALAMINE 1.2 G/1
4 TABLETS TABLET, DELAYED RELEASE ORAL ONCE A DAY
Qty: 360 TABLET | Refills: 2 | Status: ACTIVE | COMMUNITY

## 2024-05-28 RX ORDER — LEVOTHYROXINE SODIUM 25 UG/1
1 TABLET IN THE MORNING ON AN EMPTY STOMACH TABLET ORAL ONCE A DAY
Status: ACTIVE | COMMUNITY

## 2024-05-28 RX ORDER — MESALAMINE 1.2 G/1
TAKE 4 TABLETS BY MOUTH  ONCE DAILY TABLET, DELAYED RELEASE ORAL
Qty: 360 TABLET | Refills: 3 | Status: ACTIVE | COMMUNITY

## 2024-05-28 RX ORDER — EZETIMIBE 10 MG/1
1 TABLET TABLET ORAL ONCE A DAY
Status: ACTIVE | COMMUNITY

## 2024-05-28 RX ORDER — GLIMEPIRIDE 1 MG/1
1 TABLET WITH BREAKFAST OR THE FIRST MAIN MEAL OF THE DAY TABLET ORAL ONCE A DAY
Status: ACTIVE | COMMUNITY

## 2024-05-29 ENCOUNTER — TELEPHONE ENCOUNTER (OUTPATIENT)
Dept: URBAN - METROPOLITAN AREA CLINIC 96 | Facility: CLINIC | Age: 74
End: 2024-05-29

## 2024-09-02 ENCOUNTER — P2P PATIENT RECORD (OUTPATIENT)
Age: 74
End: 2024-09-02

## 2024-09-05 ENCOUNTER — OFFICE VISIT (OUTPATIENT)
Dept: URBAN - METROPOLITAN AREA TELEHEALTH 2 | Facility: TELEHEALTH | Age: 74
End: 2024-09-05
Payer: MEDICARE

## 2024-09-05 VITALS — WEIGHT: 185 LBS | HEIGHT: 69 IN | BODY MASS INDEX: 27.4 KG/M2

## 2024-09-05 DIAGNOSIS — K50.811 CROHN'S DISEASE OF BOTH SMALL AND LARGE INTESTINE WITH RECTAL BLEEDING: ICD-10-CM

## 2024-09-05 DIAGNOSIS — R19.7 DIARRHEA: ICD-10-CM

## 2024-09-05 PROCEDURE — 99213 OFFICE O/P EST LOW 20 MIN: CPT | Performed by: INTERNAL MEDICINE

## 2024-09-05 RX ORDER — AMLODIPINE BESYLATE 10 MG/1
1 TABLET TABLET ORAL ONCE A DAY
Status: ACTIVE | COMMUNITY

## 2024-09-05 RX ORDER — LEVOTHYROXINE SODIUM 25 UG/1
1 TABLET IN THE MORNING ON AN EMPTY STOMACH TABLET ORAL ONCE A DAY
Status: ACTIVE | COMMUNITY

## 2024-09-05 RX ORDER — GLIMEPIRIDE 1 MG/1
1 TABLET WITH BREAKFAST OR THE FIRST MAIN MEAL OF THE DAY TABLET ORAL ONCE A DAY
Status: ACTIVE | COMMUNITY

## 2024-09-05 RX ORDER — ROSUVASTATIN 10 MG/1
1 TABLET TABLET, FILM COATED ORAL ONCE A DAY
Status: ACTIVE | COMMUNITY

## 2024-09-05 RX ORDER — TELMISARTAN 80 MG/1
1 TABLET TABLET ORAL ONCE A DAY
Status: ACTIVE | COMMUNITY

## 2024-09-05 RX ORDER — MESALAMINE 1.2 G/1
2 TABLETS TABLET, DELAYED RELEASE ORAL ONCE A DAY
Qty: 180 TABLET | Refills: 3 | OUTPATIENT
Start: 2024-09-05 | End: 2025-08-31

## 2024-09-05 RX ORDER — KRILL/OM-3/DHA/EPA/PHOSPHO/AST 1000-230MG
1 TABLET CAPSULE ORAL ONCE A DAY
Status: ACTIVE | COMMUNITY

## 2024-09-05 RX ORDER — MESALAMINE 1.2 G/1
2 TABLETS TABLET, DELAYED RELEASE ORAL ONCE A DAY
Qty: 180 TABLET | Refills: 3 | Status: ACTIVE | COMMUNITY

## 2024-09-05 RX ORDER — CARVEDILOL 6.25 MG/1
1 TABLET WITH FOOD TABLET, FILM COATED ORAL TWICE A DAY
Status: ACTIVE | COMMUNITY

## 2024-09-05 RX ORDER — TRAZODONE HYDROCHLORIDE 50 MG/1
1 TABLET AT BEDTIME AS NEEDED TABLET ORAL ONCE A DAY
Status: ACTIVE | COMMUNITY

## 2024-09-05 RX ORDER — MESALAMINE 375 MG/1
2 CAPSULES IN THE MORNING CAPSULE, EXTENDED RELEASE ORAL ONCE A DAY
Qty: 180 CAPSULE | Refills: 4 | Status: ACTIVE | COMMUNITY
Start: 2024-05-28 | End: 2025-08-21

## 2024-09-05 RX ORDER — EZETIMIBE 10 MG/1
1 TABLET TABLET ORAL ONCE A DAY
Status: ACTIVE | COMMUNITY

## 2024-09-05 RX ORDER — FENOFIBRATE 134 MG/1
1 CAPSULE WITH A MEAL CAPSULE ORAL ONCE A DAY
Status: ACTIVE | COMMUNITY

## 2024-09-05 RX ORDER — MESALAMINE 375 MG/1
2 CAPSULES IN THE MORNING CAPSULE, EXTENDED RELEASE ORAL ONCE A DAY
Qty: 180 CAPSULE | Refills: 4 | OUTPATIENT

## 2024-09-05 NOTE — HPI-TODAY'S VISIT:
Mr. Pompa is a 74 year old male who with CD ileal/colonic, currently on mesalamine (2.4 tab) and Apriso (2 granules), here for eval of his condition. . Pt is referred by Dr. Сергей Palacios and Mariza Yao. . Pt was dxd with UC in 2021, started on mesalamine, and doing very well on this. He reports having CKD stage 3. He is not on dialysis. He has no cardiac or pulmonary issues. He is diabetic. . Prev on 5/28/2024, pt reports that overall doing well.  Seeing nephro, monitor BP.  Nl BM.  Seeing cardiologist for stress test on June 8 . Today on 9/5/2024, pt reports no side effects on medication. Taking medication.  Stools are solid, no blood. . 5/21 EGD revealed chronic inactive gastritis with focal intestinal metaplasia and histological changes suggestive of treated h pylori gastritis in the stomach antrum with negative H pylori, dysplasia, or malignancy.  . 12/2021 colonoscopy revealed diffuse chronic active colitis consistent with ulcerative colitis in left and right colon. His 10/21 EGD was normal, no specimens were collected and the 10/21 colonoscopy revealed focal mild colitis in the right and left colon.  . IBD exanded panel was leaning [facility] Crohns disease.  . MR enterography 2023 revealed no colitis but there was thickening and enhancement at the TI. . 4/2024: colononscopy: The perianal and digital rectal examinations were normal. Pertinent negatives include normal sphincter tone. Findings: Scattered mild inflammation characterized by couple of aphthous ulcers was found in the terminal ileum. Biopsies were taken with a cold forceps for histology. Estimated blood loss was minimal. Scattered mild inflammation characterized by mild superficial aphthous ulcerations was found in the ascending colon and sigmoid colon. Tjhere was also extensive mucosal scarring from healed colitis throughout the colon with pseudopolyp formation. Biopsies were taken with a cold forceps for histology. Estimated blood loss was minimal. The retroflexed view of the distal rectum and anal verge was normal and showed no anal or rectal abnormalities. . (A) Terminal Ileum, Biopsy: CHRONIC ACTIVE ILEITIS WITH APHTHOUS ULCER AND PYLORIC GLAND METAPLASIA, CONSISTENT WITH CROHN'S DISEASE. See Comment. Negative for Infectious Organisms, Dysplasia or Malignancy. COMMENT: While non-specific, basal plasmocytosis, pyloric metaplasia, and glandular distortion are characteristic of Crohn's disease. Correlation with clinical history to rule out chronic ischemic ileitis and NSAID-associated lesion is recommended. (B) Cecum, Biopsy: NO SIGNIFICANT ABNORMALITY. (C) Colon, Ascending, Biopsy: NO SIGNIFICANT ABNORMALITY. (D) Colon, Transverse, Biopsy: PATCHY CHRONIC ACTIVE COLITIS, MOST CONSISTENT WITH CROHN'S DISEASE. See Comment. Negative for Infectious Organisms, Dysplasia or Malignancy. (E) Colon, Descending, Biopsy: PATCHY CHRONIC ACTIVE COLITIS, MOST CONSISTENT WITH CROHN'S DISEASE. See Comment. Negative for Infectious Organisms, Dysplasia or Malignancy. COMMENT: Crohn's disease is generally favored over ulcerative colitis in patchy chronic active colitis. Long-standing / partially treated ulcerative colitis can also show areas of involved and uninvolved mucosa and variable degrees of activity of disease and is not excluded with certainty in this case. Patchy colitis is not pathognomonic of Crohn's disease. Correlation with clinical history and anatomic distribution of the lesion and reviewing the pre-treatment biopsy material if clinically indicated may be informative. (F) Colon, Sigmoid, Biopsy: NO SIGNIFICANT ABNORMALITY.

## 2024-11-07 ENCOUNTER — ERX REFILL RESPONSE (OUTPATIENT)
Dept: URBAN - METROPOLITAN AREA CLINIC 128 | Facility: CLINIC | Age: 74
End: 2024-11-07

## 2024-11-07 RX ORDER — MESALAMINE 1.2 G/1
TAKE 4 TABLETS BY MOUTH ONCE  DAILY TABLET, DELAYED RELEASE ORAL
Qty: 360 TABLET | Refills: 4 | OUTPATIENT

## 2024-11-07 RX ORDER — MESALAMINE 1.2 G/1
TAKE 4 TABLETS BY MOUTH ONCE  DAILY TABLET, DELAYED RELEASE ORAL
Qty: 360 TABLET | Refills: 3 | OUTPATIENT

## 2024-12-19 ENCOUNTER — TELEPHONE ENCOUNTER (OUTPATIENT)
Dept: URBAN - METROPOLITAN AREA CLINIC 96 | Facility: CLINIC | Age: 74
End: 2024-12-19

## 2025-01-06 ENCOUNTER — TELEPHONE ENCOUNTER (OUTPATIENT)
Dept: URBAN - METROPOLITAN AREA CLINIC 6 | Facility: CLINIC | Age: 75
End: 2025-01-06

## 2025-02-27 ENCOUNTER — TELEPHONE ENCOUNTER (OUTPATIENT)
Dept: URBAN - METROPOLITAN AREA CLINIC 128 | Facility: CLINIC | Age: 75
End: 2025-02-27

## 2025-02-27 ENCOUNTER — OFFICE VISIT (OUTPATIENT)
Dept: URBAN - METROPOLITAN AREA CLINIC 128 | Facility: CLINIC | Age: 75
End: 2025-02-27
Payer: COMMERCIAL

## 2025-02-27 ENCOUNTER — TELEPHONE ENCOUNTER (OUTPATIENT)
Dept: URBAN - METROPOLITAN AREA CLINIC 96 | Facility: CLINIC | Age: 75
End: 2025-02-27

## 2025-02-27 VITALS
HEIGHT: 69 IN | WEIGHT: 181 LBS | SYSTOLIC BLOOD PRESSURE: 126 MMHG | DIASTOLIC BLOOD PRESSURE: 70 MMHG | HEART RATE: 78 BPM | TEMPERATURE: 97.3 F | BODY MASS INDEX: 26.81 KG/M2

## 2025-02-27 DIAGNOSIS — K52.9 CHRONIC DIARRHEA: ICD-10-CM

## 2025-02-27 DIAGNOSIS — Z86.19 HISTORY OF HEPATITIS C: ICD-10-CM

## 2025-02-27 DIAGNOSIS — R63.4 UNINTENTIONAL WEIGHT LOSS: ICD-10-CM

## 2025-02-27 DIAGNOSIS — K52.9 COLITIS: ICD-10-CM

## 2025-02-27 DIAGNOSIS — K31.89 INTESTINAL METAPLASIA OF GASTRIC MUCOSA: ICD-10-CM

## 2025-02-27 DIAGNOSIS — K29.60 OTHER GASTRITIS WITHOUT HEMORRHAGE, UNSPECIFIED CHRONICITY: ICD-10-CM

## 2025-02-27 DIAGNOSIS — K52.9 IBD (INFLAMMATORY BOWEL DISEASE): ICD-10-CM

## 2025-02-27 PROCEDURE — 99214 OFFICE O/P EST MOD 30 MIN: CPT | Performed by: PHYSICIAN ASSISTANT

## 2025-02-27 RX ORDER — EZETIMIBE 10 MG/1
1 TABLET TABLET ORAL ONCE A DAY
Status: ACTIVE | COMMUNITY

## 2025-02-27 RX ORDER — MESALAMINE 1.2 G/1
2 TABLETS TABLET, DELAYED RELEASE ORAL ONCE A DAY
Qty: 180 TABLET | Refills: 3 | Status: ACTIVE | COMMUNITY
Start: 2024-09-05 | End: 2025-08-31

## 2025-02-27 RX ORDER — TELMISARTAN 80 MG/1
1 TABLET TABLET ORAL ONCE A DAY
Status: ACTIVE | COMMUNITY

## 2025-02-27 RX ORDER — FENOFIBRATE 134 MG/1
1 CAPSULE WITH A MEAL CAPSULE ORAL ONCE A DAY
Status: ACTIVE | COMMUNITY

## 2025-02-27 RX ORDER — AMLODIPINE BESYLATE 10 MG/1
1 TABLET TABLET ORAL ONCE A DAY
Status: ACTIVE | COMMUNITY

## 2025-02-27 RX ORDER — ASPIRIN 325 MG/1
1 TABLET TABLET, FILM COATED ORAL ONCE A DAY
Status: ACTIVE | COMMUNITY

## 2025-02-27 RX ORDER — LEVOTHYROXINE SODIUM 25 UG/1
1 TABLET IN THE MORNING ON AN EMPTY STOMACH TABLET ORAL ONCE A DAY
Status: ACTIVE | COMMUNITY

## 2025-02-27 RX ORDER — GLIMEPIRIDE 1 MG/1
1 TABLET WITH BREAKFAST OR THE FIRST MAIN MEAL OF THE DAY TABLET ORAL ONCE A DAY
Status: ACTIVE | COMMUNITY

## 2025-02-27 RX ORDER — CARVEDILOL 6.25 MG/1
1 TABLET WITH FOOD TABLET, FILM COATED ORAL TWICE A DAY
Status: ACTIVE | COMMUNITY

## 2025-02-27 RX ORDER — MESALAMINE 375 MG/1
2 CAPSULES IN THE MORNING CAPSULE, EXTENDED RELEASE ORAL ONCE A DAY
Qty: 180 CAPSULE | Refills: 4 | Status: ACTIVE | COMMUNITY

## 2025-02-27 RX ORDER — MESALAMINE 1.2 G/1
4 TABLETS TABLET, DELAYED RELEASE ORAL ONCE A DAY
Qty: 360 TABLET | Refills: 2 | OUTPATIENT

## 2025-02-27 RX ORDER — TRAZODONE HYDROCHLORIDE 50 MG/1
1 TABLET AT BEDTIME AS NEEDED TABLET ORAL ONCE A DAY
Status: ACTIVE | COMMUNITY

## 2025-02-27 RX ORDER — ROSUVASTATIN 10 MG/1
1 TABLET TABLET, FILM COATED ORAL ONCE A DAY
Status: ACTIVE | COMMUNITY

## 2025-02-27 NOTE — HPI-TODAY'S VISIT:
Mr. Pompa is a 74 year old male who with CD ileal/colonic, currently on mesalamine (2.4 tab) and Apriso (2 granules), here for eval of his condition. . Has recently seen Dr. Jose Hansen  . Pt was dxd with UC in 2021, started on mesalamine, and doing very well on this. He reports having CKD stage 3. He is not on dialysis. He has no cardiac or pulmonary issues. He is diabetic. . Prev on 5/28/2024, pt reports that overall doing well.  Seeing nephro, monitor BP.  Nl BM.  Seeing cardiologist for stress test on June 8 . Today on 2/7/25, pt reports no side effects on medication. Taking medication.  Stools are solid, no blood or mucus, diarrhea, or abdominal pain. . 5/21 EGD revealed chronic inactive gastritis with focal intestinal metaplasia and histological changes suggestive of treated h pylori gastritis in the stomach antrum with negative H pylori, dysplasia, or malignancy.  . 12/2021 colonoscopy revealed diffuse chronic active colitis consistent with ulcerative colitis in left and right colon. His 10/21 EGD was normal, no specimens were collected and the 10/21 colonoscopy revealed focal mild colitis in the right and left colon.  . IBD exanded panel was leaning [facility] Crohns disease.  . MR enterography 2023 revealed no colitis but there was thickening and enhancement at the TI. . 4/2024: colononscopy: The perianal and digital rectal examinations were normal. Pertinent negatives include normal sphincter tone. Findings: Scattered mild inflammation characterized by couple of aphthous ulcers was found in the terminal ileum. Biopsies were taken with a cold forceps for histology. Estimated blood loss was minimal. Scattered mild inflammation characterized by mild superficial aphthous ulcerations was found in the ascending colon and sigmoid colon. Tjhere was also extensive mucosal scarring from healed colitis throughout the colon with pseudopolyp formation. Biopsies were taken with a cold forceps for histology. Estimated blood loss was minimal. The retroflexed view of the distal rectum and anal verge was normal and showed no anal or rectal abnormalities. . (A) Terminal Ileum, Biopsy: CHRONIC ACTIVE ILEITIS WITH APHTHOUS ULCER AND PYLORIC GLAND METAPLASIA, CONSISTENT WITH CROHN'S DISEASE. See Comment. Negative for Infectious Organisms, Dysplasia or Malignancy. COMMENT: While non-specific, basal plasmocytosis, pyloric metaplasia, and glandular distortion are characteristic of Crohn's disease. Correlation with clinical history to rule out chronic ischemic ileitis and NSAID-associated lesion is recommended. (B) Cecum, Biopsy: NO SIGNIFICANT ABNORMALITY. (C) Colon, Ascending, Biopsy: NO SIGNIFICANT ABNORMALITY. (D) Colon, Transverse, Biopsy: PATCHY CHRONIC ACTIVE COLITIS, MOST CONSISTENT WITH CROHN'S DISEASE. See Comment. Negative for Infectious Organisms, Dysplasia or Malignancy. (E) Colon, Descending, Biopsy: PATCHY CHRONIC ACTIVE COLITIS, MOST CONSISTENT WITH CROHN'S DISEASE. See Comment. Negative for Infectious Organisms, Dysplasia or Malignancy. COMMENT: Crohn's disease is generally favored over ulcerative colitis in patchy chronic active colitis. Long-standing / partially treated ulcerative colitis can also show areas of involved and uninvolved mucosa and variable degrees of activity of disease and is not excluded with certainty in this case. Patchy colitis is not pathognomonic of Crohn's disease. Correlation with clinical history and anatomic distribution of the lesion and reviewing the pre-treatment biopsy material if clinically indicated may be informative. (F) Colon, Sigmoid, Biopsy: NO SIGNIFICANT ABNORMALITY.  Patient denies a family history of colon polyps or colon cancer or stomach cancer  Patient denies any heart, lung, or kidney problems.  Patient denies any blood thinners or oxygen therapy. Denies any dialysis. Denies any pacemaker or defibrillator.

## 2025-03-02 ENCOUNTER — WEB ENCOUNTER (OUTPATIENT)
Dept: URBAN - METROPOLITAN AREA CLINIC 96 | Facility: CLINIC | Age: 75
End: 2025-03-02

## 2025-03-03 ENCOUNTER — P2P PATIENT RECORD (OUTPATIENT)
Age: 75
End: 2025-03-03

## 2025-04-23 ENCOUNTER — TELEPHONE ENCOUNTER (OUTPATIENT)
Dept: URBAN - METROPOLITAN AREA CLINIC 19 | Facility: CLINIC | Age: 75
End: 2025-04-23

## 2025-04-23 ENCOUNTER — CLAIMS CREATED FROM THE CLAIM WINDOW (OUTPATIENT)
Dept: URBAN - METROPOLITAN AREA CLINIC 4 | Facility: CLINIC | Age: 75
End: 2025-04-23
Payer: COMMERCIAL

## 2025-04-23 ENCOUNTER — OFFICE VISIT (OUTPATIENT)
Dept: URBAN - METROPOLITAN AREA SURGERY CENTER 31 | Facility: SURGERY CENTER | Age: 75
End: 2025-04-23
Payer: COMMERCIAL

## 2025-04-23 DIAGNOSIS — K63.89 OTHER SPECIFIED DISEASES OF INTESTINE: ICD-10-CM

## 2025-04-23 DIAGNOSIS — R63.4 UNINTENTIONAL WEIGHT LOSS: ICD-10-CM

## 2025-04-23 DIAGNOSIS — R19.7 ACUTE DIARRHEA: ICD-10-CM

## 2025-04-23 DIAGNOSIS — K52.89 OTHER SPECIFIED NONINFECTIVE GASTROENTERITIS AND COLITIS: ICD-10-CM

## 2025-04-23 DIAGNOSIS — K50.818 CROHN'S DISEASE OF BOTH SMALL AND LARGE INTESTINE WITH OTHER COMPLICATION: ICD-10-CM

## 2025-04-23 PROCEDURE — 88305 TISSUE EXAM BY PATHOLOGIST: CPT | Performed by: PATHOLOGY

## 2025-04-23 PROCEDURE — 45380 COLONOSCOPY AND BIOPSY: CPT | Performed by: INTERNAL MEDICINE

## 2025-04-23 PROCEDURE — 00811 ANES LWR INTST NDSC NOS: CPT | Performed by: NURSE ANESTHETIST, CERTIFIED REGISTERED

## 2025-04-23 RX ORDER — CARVEDILOL 6.25 MG/1
1 TABLET WITH FOOD TABLET, FILM COATED ORAL TWICE A DAY
Status: ACTIVE | COMMUNITY

## 2025-04-23 RX ORDER — AMLODIPINE BESYLATE 10 MG/1
1 TABLET TABLET ORAL ONCE A DAY
Status: ACTIVE | COMMUNITY

## 2025-04-23 RX ORDER — ASPIRIN 325 MG/1
1 TABLET TABLET, FILM COATED ORAL ONCE A DAY
Status: ACTIVE | COMMUNITY

## 2025-04-23 RX ORDER — GLIMEPIRIDE 1 MG/1
1 TABLET WITH BREAKFAST OR THE FIRST MAIN MEAL OF THE DAY TABLET ORAL ONCE A DAY
Status: ACTIVE | COMMUNITY

## 2025-04-23 RX ORDER — MESALAMINE 1.2 G/1
4 TABLETS TABLET, DELAYED RELEASE ORAL ONCE A DAY
Qty: 360 TABLET | Refills: 2 | Status: ACTIVE | COMMUNITY

## 2025-04-23 RX ORDER — MESALAMINE 1.2 G/1
2 TABLETS TABLET, DELAYED RELEASE ORAL ONCE A DAY
Qty: 180 TABLET | Refills: 3 | Status: ACTIVE | COMMUNITY
Start: 2024-09-05 | End: 2025-08-31

## 2025-04-23 RX ORDER — EZETIMIBE 10 MG/1
1 TABLET TABLET ORAL ONCE A DAY
Status: ACTIVE | COMMUNITY

## 2025-04-23 RX ORDER — LEVOTHYROXINE SODIUM 25 UG/1
1 TABLET IN THE MORNING ON AN EMPTY STOMACH TABLET ORAL ONCE A DAY
Status: ACTIVE | COMMUNITY

## 2025-04-23 RX ORDER — MESALAMINE 375 MG/1
2 CAPSULES IN THE MORNING CAPSULE, EXTENDED RELEASE ORAL ONCE A DAY
Qty: 180 CAPSULE | Refills: 4 | Status: ACTIVE | COMMUNITY

## 2025-04-23 RX ORDER — TELMISARTAN 80 MG/1
1 TABLET TABLET ORAL ONCE A DAY
Status: ACTIVE | COMMUNITY

## 2025-04-23 RX ORDER — FENOFIBRATE 134 MG/1
1 CAPSULE WITH A MEAL CAPSULE ORAL ONCE A DAY
Status: ACTIVE | COMMUNITY

## 2025-04-23 RX ORDER — TRAZODONE HYDROCHLORIDE 50 MG/1
1 TABLET AT BEDTIME AS NEEDED TABLET ORAL ONCE A DAY
Status: ACTIVE | COMMUNITY

## 2025-04-23 RX ORDER — ROSUVASTATIN 10 MG/1
1 TABLET TABLET, FILM COATED ORAL ONCE A DAY
Status: ACTIVE | COMMUNITY

## 2025-05-07 ENCOUNTER — TELEPHONE ENCOUNTER (OUTPATIENT)
Dept: URBAN - METROPOLITAN AREA CLINIC 128 | Facility: CLINIC | Age: 75
End: 2025-05-07

## 2025-05-07 ENCOUNTER — OFFICE VISIT (OUTPATIENT)
Dept: URBAN - METROPOLITAN AREA CLINIC 128 | Facility: CLINIC | Age: 75
End: 2025-05-07
Payer: COMMERCIAL

## 2025-05-07 DIAGNOSIS — K52.9 IBD (INFLAMMATORY BOWEL DISEASE): ICD-10-CM

## 2025-05-07 DIAGNOSIS — R63.4 UNINTENTIONAL WEIGHT LOSS: ICD-10-CM

## 2025-05-07 DIAGNOSIS — K31.89 INTESTINAL METAPLASIA OF GASTRIC MUCOSA: ICD-10-CM

## 2025-05-07 DIAGNOSIS — K29.60 OTHER GASTRITIS WITHOUT HEMORRHAGE, UNSPECIFIED CHRONICITY: ICD-10-CM

## 2025-05-07 DIAGNOSIS — Z86.19 HISTORY OF HEPATITIS C: ICD-10-CM

## 2025-05-07 PROCEDURE — 99214 OFFICE O/P EST MOD 30 MIN: CPT | Performed by: PHYSICIAN ASSISTANT

## 2025-05-07 RX ORDER — TRAZODONE HYDROCHLORIDE 50 MG/1
1 TABLET AT BEDTIME AS NEEDED TABLET ORAL ONCE A DAY
Status: ACTIVE | COMMUNITY

## 2025-05-07 RX ORDER — GLIMEPIRIDE 1 MG/1
1 TABLET WITH BREAKFAST OR THE FIRST MAIN MEAL OF THE DAY TABLET ORAL ONCE A DAY
Status: ACTIVE | COMMUNITY

## 2025-05-07 RX ORDER — TELMISARTAN 80 MG/1
1 TABLET TABLET ORAL ONCE A DAY
Status: ACTIVE | COMMUNITY

## 2025-05-07 RX ORDER — ASPIRIN 325 MG/1
1 TABLET TABLET, FILM COATED ORAL ONCE A DAY
Status: ACTIVE | COMMUNITY

## 2025-05-07 RX ORDER — CARVEDILOL 6.25 MG/1
1 TABLET WITH FOOD TABLET, FILM COATED ORAL TWICE A DAY
Status: ACTIVE | COMMUNITY

## 2025-05-07 RX ORDER — AMLODIPINE BESYLATE 10 MG/1
1 TABLET TABLET ORAL ONCE A DAY
Status: ACTIVE | COMMUNITY

## 2025-05-07 RX ORDER — FENOFIBRATE 134 MG/1
1 CAPSULE WITH A MEAL CAPSULE ORAL ONCE A DAY
Status: ACTIVE | COMMUNITY

## 2025-05-07 RX ORDER — MESALAMINE 375 MG/1
2 CAPSULES IN THE MORNING CAPSULE, EXTENDED RELEASE ORAL ONCE A DAY
Qty: 180 CAPSULE | Refills: 4 | Status: ACTIVE | COMMUNITY

## 2025-05-07 RX ORDER — LEVOTHYROXINE SODIUM 25 UG/1
1 TABLET IN THE MORNING ON AN EMPTY STOMACH TABLET ORAL ONCE A DAY
Status: ACTIVE | COMMUNITY

## 2025-05-07 RX ORDER — MESALAMINE 1.2 G/1
4 TABLETS TABLET, DELAYED RELEASE ORAL ONCE A DAY
Qty: 360 TABLET | Refills: 2 | Status: ACTIVE | COMMUNITY

## 2025-05-07 RX ORDER — ROSUVASTATIN 10 MG/1
1 TABLET TABLET, FILM COATED ORAL ONCE A DAY
Status: ACTIVE | COMMUNITY

## 2025-05-07 RX ORDER — MESALAMINE 1.2 G/1
2 TABLETS TABLET, DELAYED RELEASE ORAL ONCE A DAY
Qty: 180 TABLET | Refills: 3 | Status: ACTIVE | COMMUNITY
Start: 2024-09-05 | End: 2025-08-31

## 2025-05-07 RX ORDER — EZETIMIBE 10 MG/1
1 TABLET TABLET ORAL ONCE A DAY
Status: ACTIVE | COMMUNITY

## 2025-05-07 NOTE — HPI-TODAY'S VISIT:
Mr. Pompa is a 74 year old male who with CD ileal/colonic, currently on mesalamine (2.4 tab) and Apriso (2 granules), here for eval of his condition. . Has recently seen Dr. Jose Hansen  . Pt was dxd with UC in 2021, started on mesalamine, and doing very well on this. He reports having CKD stage 3. He is not on dialysis. He has no cardiac or pulmonary issues. He is diabetic. . Prev on 5/28/2024, pt reports that overall doing well.  Seeing nephro, monitor BP.  Nl BM.  Seeing cardiologist for stress test on June 8 . Today on 2/7/25, pt reports no side effects on medication. Taking medication.  Stools are solid, no blood or mucus, diarrhea, or abdominal pain. . 5/21 EGD revealed chronic inactive gastritis with focal intestinal metaplasia and histological changes suggestive of treated h pylori gastritis in the stomach antrum with negative H pylori, dysplasia, or malignancy.  . 12/2021 colonoscopy revealed diffuse chronic active colitis consistent with ulcerative colitis in left and right colon. His 10/21 EGD was normal, no specimens were collected and the 10/21 colonoscopy revealed focal mild colitis in the right and left colon.  . IBD exanded panel was leaning [facility] Crohns disease.  . MR enterography 2023 revealed no colitis but there was thickening and enhancement at the TI. . 4/2024: colononscopy: The perianal and digital rectal examinations were normal. Pertinent negatives include normal sphincter tone. Findings: Scattered mild inflammation characterized by couple of aphthous ulcers was found in the terminal ileum. Biopsies were taken with a cold forceps for histology. Estimated blood loss was minimal. Scattered mild inflammation characterized by mild superficial aphthous ulcerations was found in the ascending colon and sigmoid colon. Tjhere was also extensive mucosal scarring from healed colitis throughout the colon with pseudopolyp formation. Biopsies were taken with a cold forceps for histology. Estimated blood loss was minimal. The retroflexed view of the distal rectum and anal verge was normal and showed no anal or rectal abnormalities. . (A) Terminal Ileum, Biopsy: CHRONIC ACTIVE ILEITIS WITH APHTHOUS ULCER AND PYLORIC GLAND METAPLASIA, CONSISTENT WITH CROHN'S DISEASE. See Comment. Negative for Infectious Organisms, Dysplasia or Malignancy. COMMENT: While non-specific, basal plasmocytosis, pyloric metaplasia, and glandular distortion are characteristic of Crohn's disease. Correlation with clinical history to rule out chronic ischemic ileitis and NSAID-associated lesion is recommended. (B) Cecum, Biopsy: NO SIGNIFICANT ABNORMALITY. (C) Colon, Ascending, Biopsy: NO SIGNIFICANT ABNORMALITY. (D) Colon, Transverse, Biopsy: PATCHY CHRONIC ACTIVE COLITIS, MOST CONSISTENT WITH CROHN'S DISEASE. See Comment. Negative for Infectious Organisms, Dysplasia or Malignancy. (E) Colon, Descending, Biopsy: PATCHY CHRONIC ACTIVE COLITIS, MOST CONSISTENT WITH CROHN'S DISEASE. See Comment. Negative for Infectious Organisms, Dysplasia or Malignancy. COMMENT: Crohn's disease is generally favored over ulcerative colitis in patchy chronic active colitis. Long-standing / partially treated ulcerative colitis can also show areas of involved and uninvolved mucosa and variable degrees of activity of disease and is not excluded with certainty in this case. Patchy colitis is not pathognomonic of Crohn's disease. Correlation with clinical history and anatomic distribution of the lesion and reviewing the pre-treatment biopsy material if clinically indicated may be informative. (F) Colon, Sigmoid, Biopsy: NO SIGNIFICANT ABNORMALITY.  Patient denies a family history of colon polyps or colon cancer or stomach cancer  Patient denies any heart, lung, or kidney problems.  Patient denies any blood thinners or oxygen therapy. Denies any dialysis. Denies any pacemaker or defibrillator.  4/2025 colonoscopy revealed (A) Terminal Ileum, Biopsy (Cold Forceps): PATCHY CHRONIC ILEITIS WITH FOCAL ACUTE CRYPTITIS, APHTHOUS EROSION AND CRYPT DISTORTION. Negative for Infectious Organisms, Dysplasia or Malignancy. (B) Cecum, Biopsy (Cold Forceps): PATCHY CHRONIC MILDLY ACTIVE COLITIS. Negative for dysplasia. (C) Colon, Ascending, Biopsy (Cold Forceps): NO SIGNIFICANT ABNORMALITY. Negative for dysplasia. (D) Colon, Transverse, Biopsy (Cold Forceps): NO SIGNIFICANT ABNORMALITY. Negative for dysplasia. (E) Colon, Descending, Biopsy (Cold Forceps): NO SIGNIFICANT ABNORMALITY. Negative for dysplasia. (F) Colon, Sigmoid, Biopsy (Cold Forceps): NO SIGNIFICANT ABNORMALITY. Negative for dysplasia. (G) Rectum, Biopsy (Cold Forceps): PATCHY CHRONIC MILDLY ACTIVE COLITIS. Negative for dysplasia., repeat colonoscopy is due in 1 year. He was advised to see Dr. Hansen for further evaluation.

## 2025-05-13 ENCOUNTER — OFFICE VISIT (OUTPATIENT)
Dept: URBAN - METROPOLITAN AREA TELEHEALTH 2 | Facility: TELEHEALTH | Age: 75
End: 2025-05-13
Payer: COMMERCIAL

## 2025-05-13 ENCOUNTER — LAB OUTSIDE AN ENCOUNTER (OUTPATIENT)
Dept: URBAN - METROPOLITAN AREA TELEHEALTH 2 | Facility: TELEHEALTH | Age: 75
End: 2025-05-13

## 2025-05-13 DIAGNOSIS — K50.811 CROHN'S DISEASE OF BOTH SMALL AND LARGE INTESTINE WITH RECTAL BLEEDING: ICD-10-CM

## 2025-05-13 DIAGNOSIS — R19.7 DIARRHEA: ICD-10-CM

## 2025-05-13 PROCEDURE — 99214 OFFICE O/P EST MOD 30 MIN: CPT | Performed by: INTERNAL MEDICINE

## 2025-05-13 RX ORDER — FENOFIBRATE 134 MG/1
1 CAPSULE WITH A MEAL CAPSULE ORAL ONCE A DAY
Status: ACTIVE | COMMUNITY

## 2025-05-13 RX ORDER — MESALAMINE 1.2 G/1
2 TABLETS TABLET, DELAYED RELEASE ORAL ONCE A DAY
Qty: 180 TABLET | Refills: 3 | OUTPATIENT
Start: 2025-05-13

## 2025-05-13 RX ORDER — EZETIMIBE 10 MG/1
1 TABLET TABLET ORAL ONCE A DAY
Status: ACTIVE | COMMUNITY

## 2025-05-13 RX ORDER — MESALAMINE 375 MG/1
2 CAPSULES IN THE MORNING CAPSULE, EXTENDED RELEASE ORAL ONCE A DAY
Qty: 180 CAPSULE | Refills: 4 | OUTPATIENT
Start: 2025-05-13

## 2025-05-13 RX ORDER — LEVOTHYROXINE SODIUM 25 UG/1
1 TABLET IN THE MORNING ON AN EMPTY STOMACH TABLET ORAL ONCE A DAY
Status: ACTIVE | COMMUNITY

## 2025-05-13 RX ORDER — AMLODIPINE BESYLATE 10 MG/1
1 TABLET TABLET ORAL ONCE A DAY
Status: ACTIVE | COMMUNITY

## 2025-05-13 RX ORDER — GLIMEPIRIDE 1 MG/1
1 TABLET WITH BREAKFAST OR THE FIRST MAIN MEAL OF THE DAY TABLET ORAL ONCE A DAY
Status: ACTIVE | COMMUNITY

## 2025-05-13 RX ORDER — TELMISARTAN 80 MG/1
1 TABLET TABLET ORAL ONCE A DAY
Status: ACTIVE | COMMUNITY

## 2025-05-13 RX ORDER — MESALAMINE 1.2 G/1
4 TABLETS TABLET, DELAYED RELEASE ORAL ONCE A DAY
Qty: 360 TABLET | Refills: 2 | Status: ON HOLD | COMMUNITY

## 2025-05-13 RX ORDER — CARVEDILOL 6.25 MG/1
1 TABLET WITH FOOD TABLET, FILM COATED ORAL TWICE A DAY
Status: ACTIVE | COMMUNITY

## 2025-05-13 RX ORDER — MESALAMINE 1.2 G/1
2 TABLETS TABLET, DELAYED RELEASE ORAL ONCE A DAY
Qty: 180 TABLET | Refills: 3 | Status: ACTIVE | COMMUNITY
Start: 2024-09-05 | End: 2025-08-31

## 2025-05-13 RX ORDER — MESALAMINE 375 MG/1
2 CAPSULES IN THE MORNING CAPSULE, EXTENDED RELEASE ORAL ONCE A DAY
Qty: 180 CAPSULE | Refills: 4 | Status: ACTIVE | COMMUNITY

## 2025-05-13 RX ORDER — ASPIRIN 325 MG/1
1 TABLET TABLET, FILM COATED ORAL ONCE A DAY
Status: ACTIVE | COMMUNITY

## 2025-05-13 RX ORDER — ROSUVASTATIN 10 MG/1
1 TABLET TABLET, FILM COATED ORAL ONCE A DAY
Status: ACTIVE | COMMUNITY

## 2025-05-13 RX ORDER — TRAZODONE HYDROCHLORIDE 50 MG/1
1 TABLET AT BEDTIME AS NEEDED TABLET ORAL ONCE A DAY
Status: ACTIVE | COMMUNITY

## 2025-05-13 NOTE — HPI-TODAY'S VISIT:
Mr. Pompa is a 74 year old male who with CD ileal/colonic, currently on mesalamine (2.4 tab) and Apriso (2 granules), here for eval of his condition. . Pt is referred by Dr. Сергей Palacios and Mariza Yao. . Pt was dxd with UC in 2021, started on mesalamine, and doing very well on this. He reports having CKD stage 3. He is not on dialysis. He has no cardiac or pulmonary issues. He is diabetic. . Prev on 5/28/2024, pt reports that overall doing well.  Seeing nephro, monitor BP.  Nl BM.  Seeing cardiologist for stress test on June 8 . Prev on 9/5/2024, pt reports no side effects on medication. Taking medication.  Stools are solid, no blood. . Today on 5/13/2025, pt reports that overall he is doing well.  On a z-asad for URI, so some associated diarrhea. . 5/21 EGD revealed chronic inactive gastritis with focal intestinal metaplasia and histological changes suggestive of treated h pylori gastritis in the stomach antrum with negative H pylori, dysplasia, or malignancy.  . 12/2021 colonoscopy revealed diffuse chronic active colitis consistent with ulcerative colitis in left and right colon. His 10/21 EGD was normal, no specimens were collected and the 10/21 colonoscopy revealed focal mild colitis in the right and left colon.  . IBD exanded panel was leaning [facility] Crohns disease.  . MR enterography 2023 revealed no colitis but there was thickening and enhancement at the TI. . 4/2025: Findings: The terminal ileum appeared normal. Biopsies were taken with a cold forceps for histology. Patchy mild inflammation characterized by erythema, loss of vascularity and pseudopolyps was found in the entire colon. Biopsies were taken with a cold forceps for histology. Multiple diverticula were found in the sigmoid colon. Internal hemorrhoids were found during retroflexion. The hemorrhoids were small. . (A) Terminal Ileum, Biopsy (Cold Forceps): PATCHY CHRONIC ILEITIS WITH FOCAL ACUTE CRYPTITIS, APHTHOUS EROSION AND CRYPT DISTORTION. Negative for Infectious Organisms, Dysplasia or Malignancy. (B) Cecum, Biopsy (Cold Forceps): PATCHY CHRONIC MILDLY ACTIVE COLITIS. Negative for dysplasia. (C) Colon, Ascending, Biopsy (Cold Forceps): NO SIGNIFICANT ABNORMALITY. Negative for dysplasia. (D) Colon, Transverse, Biopsy (Cold Forceps): NO SIGNIFICANT ABNORMALITY. Negative for dysplasia. (E) Colon, Descending, Biopsy (Cold Forceps): NO SIGNIFICANT ABNORMALITY. Negative for dysplasia. (F) Colon, Sigmoid, Biopsy (Cold Forceps): NO SIGNIFICANT ABNORMALITY. Negative for dysplasia. (G) Rectum, Biopsy (Cold Forceps): PATCHY CHRONIC MILDLY ACTIVE COLITIS. Negative for dysplasia. . 4/2024: colononscopy: The perianal and digital rectal examinations were normal. Pertinent negatives include normal sphincter tone. Findings: Scattered mild inflammation characterized by couple of aphthous ulcers was found in the terminal ileum. Biopsies were taken with a cold forceps for histology. Estimated blood loss was minimal. Scattered mild inflammation characterized by mild superficial aphthous ulcerations was found in the ascending colon and sigmoid colon. Tjhere was also extensive mucosal scarring from healed colitis throughout the colon with pseudopolyp formation. Biopsies were taken with a cold forceps for histology. Estimated blood loss was minimal. The retroflexed view of the distal rectum and anal verge was normal and showed no anal or rectal abnormalities. . (A) Terminal Ileum, Biopsy: CHRONIC ACTIVE ILEITIS WITH APHTHOUS ULCER AND PYLORIC GLAND METAPLASIA, CONSISTENT WITH CROHN'S DISEASE. See Comment. Negative for Infectious Organisms, Dysplasia or Malignancy. COMMENT: While non-specific, basal plasmocytosis, pyloric metaplasia, and glandular distortion are characteristic of Crohn's disease. Correlation with clinical history to rule out chronic ischemic ileitis and NSAID-associated lesion is recommended. (B) Cecum, Biopsy: NO SIGNIFICANT ABNORMALITY. (C) Colon, Ascending, Biopsy: NO SIGNIFICANT ABNORMALITY. (D) Colon, Transverse, Biopsy: PATCHY CHRONIC ACTIVE COLITIS, MOST CONSISTENT WITH CROHN'S DISEASE. See Comment. Negative for Infectious Organisms, Dysplasia or Malignancy. (E) Colon, Descending, Biopsy: PATCHY CHRONIC ACTIVE COLITIS, MOST CONSISTENT WITH CROHN'S DISEASE. See Comment. Negative for Infectious Organisms, Dysplasia or Malignancy. COMMENT: Crohn's disease is generally favored over ulcerative colitis in patchy chronic active colitis. Long-standing / partially treated ulcerative colitis can also show areas of involved and uninvolved mucosa and variable degrees of activity of disease and is not excluded with certainty in this case. Patchy colitis is not pathognomonic of Crohn's disease. Correlation with clinical history and anatomic distribution of the lesion and reviewing the pre-treatment biopsy material if clinically indicated may be informative. (F) Colon, Sigmoid, Biopsy: NO SIGNIFICANT ABNORMALITY.

## 2025-06-01 ENCOUNTER — P2P PATIENT RECORD (OUTPATIENT)
Age: 75
End: 2025-06-01

## 2025-08-25 ENCOUNTER — OFFICE VISIT (OUTPATIENT)
Dept: URBAN - METROPOLITAN AREA TELEHEALTH 2 | Facility: TELEHEALTH | Age: 75
End: 2025-08-25